# Patient Record
Sex: MALE | Race: WHITE | Employment: OTHER | URBAN - METROPOLITAN AREA
[De-identification: names, ages, dates, MRNs, and addresses within clinical notes are randomized per-mention and may not be internally consistent; named-entity substitution may affect disease eponyms.]

---

## 2020-07-06 ENCOUNTER — APPOINTMENT (OUTPATIENT)
Dept: GENERAL RADIOLOGY | Age: 55
End: 2020-07-06
Payer: COMMERCIAL

## 2020-07-06 ENCOUNTER — HOSPITAL ENCOUNTER (EMERGENCY)
Age: 55
Discharge: HOME | End: 2020-07-06
Attending: EMERGENCY MEDICINE
Payer: COMMERCIAL

## 2020-07-06 VITALS
BODY MASS INDEX: 32.93 KG/M2 | DIASTOLIC BLOOD PRESSURE: 84 MMHG | HEART RATE: 65 BPM | TEMPERATURE: 97.8 F | SYSTOLIC BLOOD PRESSURE: 180 MMHG | OXYGEN SATURATION: 93 % | WEIGHT: 230 LBS | RESPIRATION RATE: 18 BRPM | HEIGHT: 70 IN

## 2020-07-06 PROCEDURE — 94150 VITAL CAPACITY TEST: CPT

## 2020-07-06 PROCEDURE — 6370000000 HC RX 637 (ALT 250 FOR IP): Performed by: PHYSICIAN ASSISTANT

## 2020-07-06 PROCEDURE — 71046 X-RAY EXAM CHEST 2 VIEWS: CPT

## 2020-07-06 PROCEDURE — 73030 X-RAY EXAM OF SHOULDER: CPT

## 2020-07-06 PROCEDURE — 99283 EMERGENCY DEPT VISIT LOW MDM: CPT

## 2020-07-06 RX ORDER — ACETAMINOPHEN 325 MG/1
650 TABLET ORAL ONCE
Status: COMPLETED | OUTPATIENT
Start: 2020-07-06 | End: 2020-07-06

## 2020-07-06 RX ORDER — TRAMADOL HYDROCHLORIDE 50 MG/1
50 TABLET ORAL EVERY 6 HOURS PRN
Qty: 16 TABLET | Refills: 0 | Status: SHIPPED | OUTPATIENT
Start: 2020-07-06 | End: 2020-07-10

## 2020-07-06 RX ORDER — LIDOCAINE 4 G/G
1 PATCH TOPICAL ONCE
Status: DISCONTINUED | OUTPATIENT
Start: 2020-07-06 | End: 2020-07-06 | Stop reason: HOSPADM

## 2020-07-06 RX ADMIN — ACETAMINOPHEN 650 MG: 325 TABLET ORAL at 19:03

## 2020-07-06 ASSESSMENT — ENCOUNTER SYMPTOMS
VOMITING: 0
SHORTNESS OF BREATH: 0
ABDOMINAL PAIN: 0
BACK PAIN: 0
EYE REDNESS: 0
DIARRHEA: 0
NAUSEA: 0

## 2020-07-06 ASSESSMENT — PAIN DESCRIPTION - PAIN TYPE: TYPE: ACUTE PAIN

## 2020-07-06 ASSESSMENT — PAIN DESCRIPTION - LOCATION: LOCATION: ARM;SHOULDER

## 2020-07-06 ASSESSMENT — PAIN SCALES - GENERAL: PAINLEVEL_OUTOF10: 5

## 2020-07-06 NOTE — ED PROVIDER NOTES
[address] ENCOUNTER          PHYSICIAN ASSISTANT NOTE       Date of evaluation: 7/6/2020    Chief Complaint     Shoulder Injury      History of Present Illness     Carla Escamilla is a 62 y.o. male who denies any significant past medical history who presents to the emergency department with complaints of right shoulder and rib pain. Patient states that around 5:15PM this evening, he got his ladder out and went to check the gutter on the roof of his house. He states that he was about 4 steps up on the ladder when it fell underneath him. He states that he landed on the ladder. He does not believe he struck his head or lost consciousness. He is not on any blood thinners. He immediately called his girlfriend. She states that for about 20 minutes, he kept repeatedly asking the same 4 questions about what happened. He is now oriented. He complains of pain to right clavicle and right lower ribs. Denies any HA, vision changes, dizziness, n/v, neck pain, weakness or numbness, difficulty breathing, abdominal pain, hematuria. Review of Systems     Review of Systems   Constitutional: Negative for chills and fever. Eyes: Negative for redness. Respiratory: Negative for shortness of breath. Cardiovascular: Positive for chest pain (ribs). Negative for leg swelling. Gastrointestinal: Negative for abdominal pain, diarrhea, nausea and vomiting. Genitourinary: Negative for difficulty urinating. Musculoskeletal: Negative for back pain, gait problem and neck pain. +right shoulder pain   Skin: Negative for wound. Neurological: Negative for dizziness, facial asymmetry, weakness, numbness and headaches. Psychiatric/Behavioral: The patient is not nervous/anxious. Past Medical, Surgical, Family, and Social History     He has no past medical history on file. He has no past surgical history on file. His family history is not on file. He reports that he has never smoked. He has never used smokeless tobacco. He reports previous alcohol use. He reports previous drug use. Medications     Discharge Medication List as of 7/6/2020  8:15 PM          Allergies     He has No Known Allergies. Physical Exam     INITIAL VITALS: BP: (!) 180/84,Temp: 97.8 °F (36.6 °C), Pulse: 65, Resp: 18, SpO2: 93 %   Physical Exam  Vitals signs and nursing note reviewed. Constitutional:       General: He is not in acute distress. HENT:      Head: Normocephalic and atraumatic. Nose: Nose normal.      Mouth/Throat:      Mouth: Mucous membranes are moist.      Pharynx: Oropharynx is clear. Eyes:      Extraocular Movements: Extraocular movements intact. Conjunctiva/sclera: Conjunctivae normal.      Pupils: Pupils are equal, round, and reactive to light. Neck:      Musculoskeletal: Neck supple. No spinous process tenderness. Cardiovascular:      Rate and Rhythm: Normal rate and regular rhythm. Pulmonary:      Effort: Pulmonary effort is normal. No respiratory distress. Breath sounds: Normal breath sounds. No wheezing, rhonchi or rales. Chest:      Chest wall: Tenderness (right posterior, lateral, and anterior ribs) present. No deformity, swelling or crepitus. Abdominal:      General: Bowel sounds are normal. There is no distension. Palpations: Abdomen is soft. Tenderness: There is no abdominal tenderness. There is no guarding. Musculoskeletal:      Thoracic back: He exhibits normal range of motion and no bony tenderness. Lumbar back: He exhibits normal range of motion and no bony tenderness. Comments: Tenderness, swelling and crepitus overlying the right clavicle. The right shoulder is non-tender to palpation. Pain elicited over the right clavicle with movement of the right arm. 2+ radial pulse. SILT. 5/5  strength bilaterally. Skin:     General: Skin is warm. Neurological:      General: No focal deficit present.       Mental Status: He is alert and oriented to person, place, and time. GCS: GCS eye subscore is 4. GCS verbal subscore is 5. GCS motor subscore is 6. Cranial Nerves: No cranial nerve deficit. Sensory: No sensory deficit. Motor: No weakness. Coordination: Coordination normal.   Psychiatric:         Mood and Affect: Mood normal.         Behavior: Behavior normal.         Diagnostic Results     RADIOLOGY:  XR CHEST STANDARD (2 VW)   Final Result      Depressed fractures affecting the sixth and seventh ribs and possibly fifth and sixth ribs as well. Fourth rib not well visualized. .      Oblique  fracture of the mid clavicle on the right side with volume loss and pleural thickening in the right lung. XR SHOULDER RIGHT (MIN 2 VIEWS)   Final Result      Some joint space loss of the glenohumeral joint and hypertrophic change of the TRISR [facility] joint      Oblique mid clavicle comminuted fracture of uncertain age, correlate clinically with separation of fracture fragments. This could be acute          LABS:   No results found for this visit on 07/06/20. RECENT VITALS:  BP: (!) 180/84, Temp: 97.8 °F (36.6 °C), Pulse: 65,Resp: 18, SpO2: 93 %     ED Course     Nursing Notes, Past Medical Hx, Past Surgical Hx, Social Hx, Allergies, and Family Hx were reviewed. The patient was given the followingmedications:  Orders Placed This Encounter   Medications    acetaminophen (TYLENOL) tablet 650 mg    traMADol (ULTRAM) 50 MG tablet     Sig: Take 1 tablet by mouth every 6 hours as needed for Pain for up to 4 days. Intended supply: 3 days. Take lowest dose possible to manage pain     Dispense:  16 tablet     Refill:  0    lidocaine 4 % external patch 1 patch       CONSULTS:  None    MEDICAL DECISION MAKING / ASSESSMENT / Jeremy Sears is a 62 y.o. male who denies any significant past medical history who presents to the emergency department with complaints of right shoulder and rib pain.  Patient was about 4 steps up on ladder when it slid out from under him. He denies head injury or LOC. He is not on any blood thinners. His girlfriend states this happened about 5:15PM. For the first 20 minutes, he kept repeating the same questions. He denies any headache, dizziness, vision changes, n/t, weakness, n/v, chest pain, shortness of breath, abd pain. Physical exam reveals Tenderness, swelling and crepitus overlying the right clavicle. The right shoulder is non-tender to palpation. Pain elicited over the right clavicle with movement of the right arm. 2+ radial pulse. SILT. 5/5  strength bilaterally. There is also tenderness over the right posterior, lateral, and anterior ribs. No deformity, ecchymosis or swelling. Lungs clear. Abdomen soft and non-tender. Please see HPI & physical above for more information. Given short period where patient was repeating himself after event, concussion and intracranial hemorrhage were considered. However, patient is alert and oriented here without neurologic complaints. He is back to baseline per girlfriend at bedside. He has no neuro deficits. He was observed for 2 hours post injury without any changes. Patient's girlfriend states that she feels comfortable staying with him this evening. Patient given 650 mg of Tylenol and Lidocaine patch for pain. X-rays of the chest and right shoulder were obtained. Imaging revealed oblique fracture of the mid clavicle and several right sided rib fractures. No PTX. Patient was placed in a sling for immobilization of the RUE/clavicle. He was taught incentive spirometry. He had good pain control in ED with just tylenol and lidocaine patch. After xrays, patient did report pain in the right lateral hip. However, he was able to range the right lower extremity and ambulate without difficulty. Xrays were offered but patient declined. He feels comfortable seeking re-evaluation if pain does not improve or worsens.  He is given a script for Tramadol for moderate/severe pain and instructed to follow up with Orthopedics for clavicle fracture in 1 week. This patient was also evaluated by the attending physician. All care plans were discussed and agreed upon. Clinical Impression     1. Closed fracture of multiple ribs of right side, initial encounter    2. Closed displaced fracture of shaft of right clavicle, initial encounter        Disposition     PATIENT REFERRED TO:  Mora Gutierrez, [address]  [phone]    Schedule an appointment as soon as possible for a visit   (Ortho)    [facility] Emergency Department  [address] [place] [address]  [phone]    If symptoms worsen      DISCHARGE MEDICATIONS:  Discharge Medication List as of 7/6/2020  8:15 PM      START taking these medications    Details   traMADol (ULTRAM) 50 MG tablet Take 1 tablet by mouth every 6 hours as needed for Pain for up to 4 days. Intended supply: 3 days.  Take lowest dose possible to manage pain, Disp-16 tablet, R-0Print              DISPOSITION Decision To Discharge 07/06/2020 08:00:01 PM       Debby Tam PA-C  07/06/20 [id number]

## 2020-07-06 NOTE — ED TRIAGE NOTES
Pt here today for falling off a ladder while he was getting up on his roof. Pt states the ladder slipped he fell and injured his right shoulder. Pt's wife states pt has been asking a lot of questions and not being able to recall the event. Denies hitting head.

## 2020-07-07 NOTE — ED NOTES
Discharge order received. Patient being DC home with family. All paperwork explained to patient. He verbalizes understanding and denies any questions. All belongings sent with patient.       James Reeves RN  07/06/20 2039